# Patient Record
Sex: FEMALE | Race: BLACK OR AFRICAN AMERICAN | NOT HISPANIC OR LATINO | Employment: FULL TIME | URBAN - METROPOLITAN AREA
[De-identification: names, ages, dates, MRNs, and addresses within clinical notes are randomized per-mention and may not be internally consistent; named-entity substitution may affect disease eponyms.]

---

## 2019-05-18 PROBLEM — I15.9 SECONDARY HYPERTENSION: Status: ACTIVE | Noted: 2019-05-18

## 2019-05-18 PROBLEM — Z79.4 TYPE 2 DIABETES MELLITUS WITH COMPLICATION, WITH LONG-TERM CURRENT USE OF INSULIN: Status: ACTIVE | Noted: 2019-05-18

## 2019-05-18 PROBLEM — E66.09 OBESITY DUE TO EXCESS CALORIES WITH SERIOUS COMORBIDITY: Status: ACTIVE | Noted: 2019-05-18

## 2019-05-18 PROBLEM — E11.8 TYPE 2 DIABETES MELLITUS WITH COMPLICATION, WITH LONG-TERM CURRENT USE OF INSULIN: Status: ACTIVE | Noted: 2019-05-18

## 2019-05-18 PROBLEM — S02.30XA ORBITAL FLOOR (BLOW-OUT) CLOSED FRACTURE: Status: ACTIVE | Noted: 2019-05-18

## 2019-08-23 PROBLEM — S02.30XA ORBITAL FLOOR (BLOW-OUT) CLOSED FRACTURE: Status: RESOLVED | Noted: 2019-05-18 | Resolved: 2019-08-23

## 2019-12-06 PROBLEM — N92.1 MENORRHAGIA WITH IRREGULAR CYCLE: Status: ACTIVE | Noted: 2019-12-06

## 2019-12-06 PROBLEM — Z97.5 IUD (INTRAUTERINE DEVICE) IN PLACE: Status: ACTIVE | Noted: 2019-12-06

## 2019-12-06 PROBLEM — R10.30 LOWER ABDOMINAL PAIN: Status: ACTIVE | Noted: 2019-12-06

## 2019-12-06 PROBLEM — D25.9 UTERINE LEIOMYOMA: Status: ACTIVE | Noted: 2019-12-06

## 2019-12-18 PROBLEM — H05.20 PROPTOSIS: Status: ACTIVE | Noted: 2018-04-22

## 2019-12-18 PROBLEM — J33.8 MAXILLARY SINUS POLYP: Status: ACTIVE | Noted: 2018-05-01

## 2019-12-18 PROBLEM — S02.85XA: Status: ACTIVE | Noted: 2018-04-22

## 2019-12-18 PROBLEM — M54.9 CHRONIC BACK PAIN: Status: ACTIVE | Noted: 2018-09-12

## 2019-12-18 PROBLEM — G89.29 CHRONIC BACK PAIN: Status: ACTIVE | Noted: 2018-09-12

## 2019-12-18 PROBLEM — F10.10 ETOH ABUSE: Status: ACTIVE | Noted: 2018-04-22

## 2020-05-19 PROBLEM — M25.561 RIGHT KNEE PAIN: Status: ACTIVE | Noted: 2020-05-19

## 2020-05-19 PROBLEM — K21.9 GERD (GASTROESOPHAGEAL REFLUX DISEASE): Status: ACTIVE | Noted: 2020-05-19

## 2020-05-19 PROBLEM — G43.909 MIGRAINE: Status: ACTIVE | Noted: 2020-05-19

## 2020-05-19 PROBLEM — Z00.00 HEALTHCARE MAINTENANCE: Status: ACTIVE | Noted: 2020-05-19

## 2020-06-10 PROBLEM — R26.2 DIFFICULTY WALKING: Status: ACTIVE | Noted: 2020-06-10

## 2020-07-16 PROBLEM — E66.01 CLASS 3 SEVERE OBESITY DUE TO EXCESS CALORIES WITH SERIOUS COMORBIDITY AND BODY MASS INDEX (BMI) OF 40.0 TO 44.9 IN ADULT: Status: ACTIVE | Noted: 2019-05-18

## 2020-07-16 PROBLEM — Z00.00 HEALTHCARE MAINTENANCE: Status: RESOLVED | Noted: 2020-05-19 | Resolved: 2020-07-16

## 2020-07-16 PROBLEM — E66.813 CLASS 3 SEVERE OBESITY DUE TO EXCESS CALORIES WITH SERIOUS COMORBIDITY AND BODY MASS INDEX (BMI) OF 40.0 TO 44.9 IN ADULT: Status: ACTIVE | Noted: 2019-05-18

## 2021-07-19 ENCOUNTER — PATIENT OUTREACH (OUTPATIENT)
Dept: ADMINISTRATIVE | Facility: HOSPITAL | Age: 41
End: 2021-07-19

## 2021-08-30 PROBLEM — M54.9 CHRONIC BACK PAIN: Status: RESOLVED | Noted: 2018-09-12 | Resolved: 2021-08-30

## 2021-08-30 PROBLEM — R10.30 LOWER ABDOMINAL PAIN: Status: RESOLVED | Noted: 2019-12-06 | Resolved: 2021-08-30

## 2021-08-30 PROBLEM — G89.29 CHRONIC BACK PAIN: Status: RESOLVED | Noted: 2018-09-12 | Resolved: 2021-08-30

## 2021-08-30 PROBLEM — R26.2 DIFFICULTY WALKING: Status: RESOLVED | Noted: 2020-06-10 | Resolved: 2021-08-30

## 2021-08-30 PROBLEM — S02.85XA: Status: RESOLVED | Noted: 2018-04-22 | Resolved: 2021-08-30

## 2021-08-30 PROBLEM — G43.909 MIGRAINE: Status: RESOLVED | Noted: 2020-05-19 | Resolved: 2021-08-30

## 2021-08-30 PROBLEM — J33.8 MAXILLARY SINUS POLYP: Status: RESOLVED | Noted: 2018-05-01 | Resolved: 2021-08-30

## 2021-08-30 PROBLEM — M25.561 RIGHT KNEE PAIN: Status: RESOLVED | Noted: 2020-05-19 | Resolved: 2021-08-30

## 2021-08-30 PROBLEM — H05.20 PROPTOSIS: Status: RESOLVED | Noted: 2018-04-22 | Resolved: 2021-08-30

## 2021-10-25 PROBLEM — Z97.5 IUD (INTRAUTERINE DEVICE) IN PLACE: Status: RESOLVED | Noted: 2019-12-06 | Resolved: 2021-10-25

## 2022-03-17 PROBLEM — N93.9 ABNORMAL UTERINE BLEEDING: Status: ACTIVE | Noted: 2022-03-17

## 2022-03-17 PROBLEM — Z01.818 PREOPERATIVE EXAM FOR GYNECOLOGIC SURGERY: Status: ACTIVE | Noted: 2022-03-17

## 2022-03-21 PROBLEM — Z90.710 S/P TAH (TOTAL ABDOMINAL HYSTERECTOMY): Status: ACTIVE | Noted: 2022-03-21

## 2023-01-10 ENCOUNTER — PATIENT OUTREACH (OUTPATIENT)
Dept: ADMINISTRATIVE | Facility: OTHER | Age: 43
End: 2023-01-10
Payer: MEDICAID

## 2023-01-10 PROBLEM — F32.A ANXIETY AND DEPRESSION: Status: ACTIVE | Noted: 2023-01-10

## 2023-01-10 PROBLEM — F41.9 ANXIETY AND DEPRESSION: Status: ACTIVE | Noted: 2023-01-10

## 2023-01-10 PROBLEM — J31.0 RHINITIS: Status: ACTIVE | Noted: 2023-01-10

## 2023-01-10 PROBLEM — E11.42 TYPE 2 DIABETES MELLITUS WITH DIABETIC POLYNEUROPATHY, WITH LONG-TERM CURRENT USE OF INSULIN: Status: ACTIVE | Noted: 2023-01-10

## 2023-01-10 PROBLEM — R52 PAIN: Status: ACTIVE | Noted: 2023-01-10

## 2023-01-10 PROBLEM — R05.1 ACUTE COUGH: Status: ACTIVE | Noted: 2023-01-10

## 2023-01-10 PROBLEM — Z79.4 TYPE 2 DIABETES MELLITUS WITH DIABETIC POLYNEUROPATHY, WITH LONG-TERM CURRENT USE OF INSULIN: Status: ACTIVE | Noted: 2023-01-10

## 2023-01-10 PROBLEM — Z12.31 ENCOUNTER FOR MAMMOGRAM TO ESTABLISH BASELINE MAMMOGRAM: Status: ACTIVE | Noted: 2023-01-10

## 2023-01-10 PROBLEM — E61.1 IRON DEFICIENCY: Status: ACTIVE | Noted: 2023-01-10

## 2023-01-10 NOTE — PROGRESS NOTES
CHW - Initial Contact    This Community Health Worker completed  the Social Determinant of Health questionnaire with patient during clinic visit today.    Pt identified barriers of most importance are:  NONE    Follow up required: NO  No future outreach task assigned

## 2023-02-07 ENCOUNTER — PATIENT OUTREACH (OUTPATIENT)
Dept: ADMINISTRATIVE | Facility: HOSPITAL | Age: 43
End: 2023-02-07
Payer: MEDICAID

## 2023-09-11 PROBLEM — Z00.00 PREVENTATIVE HEALTH CARE: Status: ACTIVE | Noted: 2023-01-10

## 2023-09-11 PROBLEM — H53.9 VISION CHANGES: Chronic | Status: ACTIVE | Noted: 2023-09-11

## 2023-09-11 PROBLEM — G89.29 CHRONIC BILATERAL LOW BACK PAIN WITHOUT SCIATICA: Status: ACTIVE | Noted: 2023-09-11

## 2023-09-11 PROBLEM — Z79.899 ENCOUNTER FOR LONG-TERM CURRENT USE OF MEDICATION: Status: ACTIVE | Noted: 2023-01-10

## 2023-09-11 PROBLEM — Z76.89 ENCOUNTER TO ESTABLISH CARE WITH NEW DOCTOR: Status: ACTIVE | Noted: 2023-01-10

## 2023-09-11 PROBLEM — M54.50 CHRONIC BILATERAL LOW BACK PAIN WITHOUT SCIATICA: Status: ACTIVE | Noted: 2023-09-11

## 2023-12-01 ENCOUNTER — PATIENT OUTREACH (OUTPATIENT)
Dept: ADMINISTRATIVE | Facility: HOSPITAL | Age: 43
End: 2023-12-01
Payer: MEDICAID

## 2024-03-04 ENCOUNTER — PATIENT OUTREACH (OUTPATIENT)
Dept: ADMINISTRATIVE | Facility: HOSPITAL | Age: 44
End: 2024-03-04

## 2024-03-14 ENCOUNTER — PATIENT OUTREACH (OUTPATIENT)
Dept: ADMINISTRATIVE | Facility: HOSPITAL | Age: 44
End: 2024-03-14

## 2024-03-14 ENCOUNTER — PATIENT MESSAGE (OUTPATIENT)
Dept: ADMINISTRATIVE | Facility: HOSPITAL | Age: 44
End: 2024-03-14

## 2024-04-01 ENCOUNTER — PATIENT OUTREACH (OUTPATIENT)
Dept: ADMINISTRATIVE | Facility: HOSPITAL | Age: 44
End: 2024-04-01

## 2025-01-30 ENCOUNTER — PATIENT OUTREACH (OUTPATIENT)
Dept: ADMINISTRATIVE | Facility: HOSPITAL | Age: 45
End: 2025-01-30

## 2025-01-30 DIAGNOSIS — Z79.4 TYPE 2 DIABETES MELLITUS WITH DIABETIC POLYNEUROPATHY, WITH LONG-TERM CURRENT USE OF INSULIN: ICD-10-CM

## 2025-01-30 DIAGNOSIS — Z79.4 TYPE 2 DIABETES MELLITUS WITH COMPLICATION, WITH LONG-TERM CURRENT USE OF INSULIN: Primary | ICD-10-CM

## 2025-01-30 DIAGNOSIS — E11.42 TYPE 2 DIABETES MELLITUS WITH DIABETIC POLYNEUROPATHY, WITH LONG-TERM CURRENT USE OF INSULIN: ICD-10-CM

## 2025-01-30 DIAGNOSIS — E11.8 TYPE 2 DIABETES MELLITUS WITH COMPLICATION, WITH LONG-TERM CURRENT USE OF INSULIN: Primary | ICD-10-CM

## 2025-01-31 PROBLEM — R05.1 ACUTE COUGH: Status: RESOLVED | Noted: 2023-01-10 | Resolved: 2025-01-31

## 2025-01-31 PROBLEM — F10.10 ETOH ABUSE: Status: RESOLVED | Noted: 2018-04-22 | Resolved: 2025-01-31

## 2025-01-31 PROBLEM — H53.9 VISION CHANGES: Chronic | Status: RESOLVED | Noted: 2023-09-11 | Resolved: 2025-01-31

## 2025-01-31 PROBLEM — Z76.89 ENCOUNTER TO ESTABLISH CARE WITH NEW DOCTOR: Status: RESOLVED | Noted: 2023-01-10 | Resolved: 2025-01-31

## 2025-01-31 PROBLEM — N92.1 MENORRHAGIA WITH IRREGULAR CYCLE: Status: RESOLVED | Noted: 2019-12-06 | Resolved: 2025-01-31

## 2025-01-31 PROBLEM — E11.42 TYPE 2 DIABETES MELLITUS WITH DIABETIC POLYNEUROPATHY, WITH LONG-TERM CURRENT USE OF INSULIN: Status: RESOLVED | Noted: 2023-01-10 | Resolved: 2025-01-31

## 2025-01-31 PROBLEM — E61.1 IRON DEFICIENCY: Status: RESOLVED | Noted: 2023-01-10 | Resolved: 2025-01-31

## 2025-01-31 PROBLEM — Z01.818 PREOPERATIVE EXAM FOR GYNECOLOGIC SURGERY: Status: RESOLVED | Noted: 2022-03-17 | Resolved: 2025-01-31

## 2025-01-31 PROBLEM — R52 PAIN: Status: RESOLVED | Noted: 2023-01-10 | Resolved: 2025-01-31

## 2025-01-31 PROBLEM — Z90.710 S/P TAH (TOTAL ABDOMINAL HYSTERECTOMY): Status: RESOLVED | Noted: 2022-03-21 | Resolved: 2025-01-31

## 2025-01-31 PROBLEM — N93.9 ABNORMAL UTERINE BLEEDING: Status: RESOLVED | Noted: 2022-03-17 | Resolved: 2025-01-31

## 2025-01-31 PROBLEM — J21.0 RSV (ACUTE BRONCHIOLITIS DUE TO RESPIRATORY SYNCYTIAL VIRUS): Status: ACTIVE | Noted: 2025-01-31

## 2025-01-31 PROBLEM — Z79.4 TYPE 2 DIABETES MELLITUS WITH DIABETIC POLYNEUROPATHY, WITH LONG-TERM CURRENT USE OF INSULIN: Status: RESOLVED | Noted: 2023-01-10 | Resolved: 2025-01-31

## 2025-02-10 PROBLEM — Z79.4 TYPE 2 DIABETES MELLITUS WITH DIABETIC POLYNEUROPATHY, WITH LONG-TERM CURRENT USE OF INSULIN: Status: RESOLVED | Noted: 2023-01-10 | Resolved: 2025-02-10

## 2025-02-10 PROBLEM — J31.0 RHINITIS: Status: RESOLVED | Noted: 2023-01-10 | Resolved: 2025-02-10

## 2025-02-10 PROBLEM — E11.42 TYPE 2 DIABETES MELLITUS WITH DIABETIC POLYNEUROPATHY, WITH LONG-TERM CURRENT USE OF INSULIN: Status: RESOLVED | Noted: 2023-01-10 | Resolved: 2025-02-10

## 2025-02-10 PROBLEM — E78.5 HYPERLIPIDEMIA: Status: ACTIVE | Noted: 2025-02-10

## 2025-02-14 ENCOUNTER — PATIENT OUTREACH (OUTPATIENT)
Dept: ADMINISTRATIVE | Facility: HOSPITAL | Age: 45
End: 2025-02-14

## 2025-02-14 DIAGNOSIS — E11.8 TYPE 2 DIABETES MELLITUS WITH COMPLICATION, WITH LONG-TERM CURRENT USE OF INSULIN: Primary | ICD-10-CM

## 2025-02-14 DIAGNOSIS — Z79.4 TYPE 2 DIABETES MELLITUS WITH COMPLICATION, WITH LONG-TERM CURRENT USE OF INSULIN: Primary | ICD-10-CM

## 2025-04-03 ENCOUNTER — PATIENT OUTREACH (OUTPATIENT)
Dept: ADMINISTRATIVE | Facility: HOSPITAL | Age: 45
End: 2025-04-03

## 2025-04-04 ENCOUNTER — TELEPHONE (OUTPATIENT)
Dept: ADMINISTRATIVE | Facility: HOSPITAL | Age: 45
End: 2025-04-04

## 2025-04-04 ENCOUNTER — PATIENT MESSAGE (OUTPATIENT)
Dept: ADMINISTRATIVE | Facility: HOSPITAL | Age: 45
End: 2025-04-04
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
## 2025-04-13 PROBLEM — E11.8 TYPE 2 DIABETES MELLITUS WITH COMPLICATION, WITH LONG-TERM CURRENT USE OF INSULIN: Status: RESOLVED | Noted: 2019-05-18 | Resolved: 2025-04-13

## 2025-04-13 PROBLEM — S50.862A INSECT BITE OF LEFT FOREARM: Status: ACTIVE | Noted: 2025-04-13

## 2025-04-13 PROBLEM — Z79.4 TYPE 2 DIABETES MELLITUS WITH COMPLICATION, WITH LONG-TERM CURRENT USE OF INSULIN: Status: RESOLVED | Noted: 2019-05-18 | Resolved: 2025-04-13

## 2025-04-13 PROBLEM — J21.0 RSV (ACUTE BRONCHIOLITIS DUE TO RESPIRATORY SYNCYTIAL VIRUS): Status: RESOLVED | Noted: 2025-01-31 | Resolved: 2025-04-13

## 2025-07-03 ENCOUNTER — TELEPHONE (OUTPATIENT)
Dept: PHARMACY | Facility: CLINIC | Age: 45
End: 2025-07-03

## 2025-07-03 NOTE — TELEPHONE ENCOUNTER
[facility]/Population Health Chart Review & Patient Outreach Details For Medication Adherence Project    Reason for Outreach Encounter: 3rd Party payor non-compliance report ([employer], [employer], C, etc)  2.  Patient Outreach Method: Reviewed patient chart   3.   Medication in question:    Hypertension Medications              amLODIPine (NORVASC) 10 MG tablet Take 1 tablet (10 mg total) by mouth once daily.    olmesartan-hydrochlorothiazide (BENICAR HCT) 40-25 mg per tablet Take 1 tablet by mouth once daily.                4.  Reviewed and or Updates Made To: Patient Chart  5. Outreach Outcomes and/or actions taken: Medication discontinued  Additional Notes:   Losartan/HCTZ d/c-ed. No fill history for Olmesartan/HCTZ

## 2025-08-08 ENCOUNTER — TELEPHONE (OUTPATIENT)
Dept: PHARMACY | Facility: CLINIC | Age: 45
End: 2025-08-08

## 2025-08-08 NOTE — TELEPHONE ENCOUNTER
[facility]/Population Health Chart Review & Patient Outreach Details For Medication Adherence Project    Reason for Outreach Encounter: 3rd Party payor non-compliance report ([employer], [employer], [employer], etc)  2.  Patient Outreach Method: Reviewed patient chart   3.   Medication in question:    Hypertension Medications              amLODIPine (NORVASC) 10 MG tablet Take 1 tablet (10 mg total) by mouth once daily.    olmesartan-hydrochlorothiazide (BENICAR HCT) 40-25 mg per tablet Take 1 tablet by mouth once daily.                 Benicar HCT  last filled  7/2/25 for 90 day supply       4.  Reviewed and or Updates Made To: Patient Chart  5. Outreach Outcomes and/or actions taken: Patient filled medication and is on track to be adherent  Additional Notes: